# Patient Record
Sex: FEMALE | Race: WHITE | Employment: FULL TIME | URBAN - METROPOLITAN AREA
[De-identification: names, ages, dates, MRNs, and addresses within clinical notes are randomized per-mention and may not be internally consistent; named-entity substitution may affect disease eponyms.]

---

## 2018-08-02 ENCOUNTER — OFFICE VISIT (OUTPATIENT)
Dept: INTERNAL MEDICINE CLINIC | Age: 23
End: 2018-08-02

## 2018-08-02 VITALS
HEART RATE: 89 BPM | BODY MASS INDEX: 18.72 KG/M2 | SYSTOLIC BLOOD PRESSURE: 116 MMHG | TEMPERATURE: 98.1 F | OXYGEN SATURATION: 98 % | WEIGHT: 134.2 LBS | DIASTOLIC BLOOD PRESSURE: 68 MMHG

## 2018-08-02 DIAGNOSIS — Z01.818 PREOP EXAMINATION: Primary | ICD-10-CM

## 2018-08-02 DIAGNOSIS — Z01.818 PREOPERATIVE EXAMINATION: Primary | ICD-10-CM

## 2018-08-02 LAB
CONTROL: NORMAL
PREGNANCY TEST URINE, POC: NEGATIVE

## 2018-08-02 PROCEDURE — 99242 OFF/OP CONSLTJ NEW/EST SF 20: CPT | Performed by: INTERNAL MEDICINE

## 2018-08-02 PROCEDURE — 81025 URINE PREGNANCY TEST: CPT | Performed by: INTERNAL MEDICINE

## 2018-08-02 ASSESSMENT — ENCOUNTER SYMPTOMS
WHEEZING: 0
CONSTIPATION: 0
ABDOMINAL PAIN: 0
SHORTNESS OF BREATH: 0
COUGH: 0
NAUSEA: 0
VOMITING: 0
SORE THROAT: 0
DIARRHEA: 0
RHINORRHEA: 0
BLOOD IN STOOL: 0

## 2018-08-02 ASSESSMENT — PATIENT HEALTH QUESTIONNAIRE - PHQ9
1. LITTLE INTEREST OR PLEASURE IN DOING THINGS: 0
SUM OF ALL RESPONSES TO PHQ QUESTIONS 1-9: 0
2. FEELING DOWN, DEPRESSED OR HOPELESS: 0
SUM OF ALL RESPONSES TO PHQ9 QUESTIONS 1 & 2: 0

## 2018-08-02 NOTE — PATIENT INSTRUCTIONS
Stop all aspirin, ibuprofen, aleve, or any other arthritis pill (Tylenol/acetaminophen is   OK) for seven days prior to the procedure. Eat and drink nothing after midnight the night before the planned procedure.

## 2018-08-02 NOTE — PROGRESS NOTES
Subjective:      Patient ID: Jose Juan Munguia is a 26 y.o. female. HPI     Chief Complaint   Patient presents with    Pre-op Exam     8/6, screw removal right foot, Yoana De Guzman (fax# [phone])      Planned removal of hardware from right foot after foot surgery     Not ill lately  No fever/chills      Current Outpatient Prescriptions   Medication Sig Dispense Refill    Levonorgestrel (KYLEENA IU) by Intrauterine route       No current facility-administered medications for this visit. Allergies:  No Known Allergies  Prior Medical History:       Diagnosis Date    Chronic nonintractable headache 8/2/2016     Social History  Social History   Substance Use Topics    Smoking status: Never Smoker    Smokeless tobacco: Never Used    Alcohol use Yes     Prior Surgical History:      Procedure Laterality Date    FOOT SURGERY Right 12/2017    WISDOM TOOTH EXTRACTION      26  years  old     Family History:   Family History   Problem Relation Age of Onset    Heart Disease Maternal Grandmother     Heart Disease Maternal Grandfather     Diabetes Paternal Grandmother     Depression Paternal Grandfather     Other Mother         fibromyalgia    Stroke Father     Heart Disease Father         \"hole in his heart\"         Review of Systems   Constitutional: Negative for appetite change, chills, fever and unexpected weight change. HENT: Negative for dental problem, rhinorrhea and sore throat. Respiratory: Negative for cough, shortness of breath and wheezing. Cardiovascular: Negative for chest pain, palpitations and leg swelling. Gastrointestinal: Negative for abdominal pain, blood in stool, constipation, diarrhea, nausea and vomiting. Genitourinary: Negative for dysuria and hematuria. Musculoskeletal: Positive for arthralgias. Negative for myalgias. Neurological: Negative for syncope, numbness and headaches (headaches resolved with retainer).    Hematological: Negative for

## 2018-10-23 ENCOUNTER — TELEPHONE (OUTPATIENT)
Dept: INTERNAL MEDICINE CLINIC | Age: 23
End: 2018-10-23

## 2018-12-13 ENCOUNTER — CLINICAL DOCUMENTATION (OUTPATIENT)
Dept: INTERNAL MEDICINE CLINIC | Age: 23
End: 2018-12-13

## 2018-12-13 ENCOUNTER — HOSPITAL ENCOUNTER (OUTPATIENT)
Age: 23
Discharge: HOME | End: 2018-12-13
Payer: COMMERCIAL

## 2018-12-13 LAB
IGA: 133 MG/DL (ref 70–400)
T4 TOTAL: 7.7 UG/DL (ref 4.5–10.9)
TSH SERPL DL<=0.05 MIU/L-ACNC: 1.14 UIU/ML (ref 0.27–4.2)

## 2018-12-13 PROCEDURE — 84436 ASSAY OF TOTAL THYROXINE: CPT

## 2018-12-13 PROCEDURE — 84443 ASSAY THYROID STIM HORMONE: CPT

## 2018-12-13 PROCEDURE — 83516 IMMUNOASSAY NONANTIBODY: CPT

## 2018-12-13 PROCEDURE — 82784 ASSAY IGA/IGD/IGG/IGM EACH: CPT

## 2018-12-13 PROCEDURE — 36415 COLL VENOUS BLD VENIPUNCTURE: CPT

## 2018-12-15 LAB — TISSUE TRANSGLUTAMINASE IGA: 0 U/ML (ref 0–3)

## 2021-02-18 NOTE — PROGRESS NOTES
Patient ___ reached   __X___not reached-preop instructions left on voice fyvd____[phone]_________      DATE__2/25/21______ TIME__0730______ARRIVAL__0600  FEC_______      Nothing to eat or drink after midnight the night before,except for what the prep instructions call for. If you do not have the instructions or do not understand them please contact your doctors office. Follow any instructions your doctors office has given you including what medications to take the AM of your procedure and which ones to hold. You may use your inhalers. If you take a long acting insulin the laura prior please cut the dose in half and take no diabetic medications that AM.Follow specific doctors office instructions regarding blood thinners and if they want you to hold and for how long. If you are on a blood thinner and have no instructions please contact the office and ask. Dress comfortably,bring your insurance card,picture ID,and a complete list of medications, including supplements. You must have a responsible adult to stay with you during the procedure,drive you home and stay with you. [facility] phone number [phone] for any questions. OTHER INTRUCTIONS(if applicable)_________________________________________________________      COVID TEST         _____ Done ___ where ____       _X____ Scheduled 2/19/21  ___ where __[facility]__       _____Other_________________      VISITOR POLICY(subject to change)    There is a one visitor policy at [facility] for all surgeries and endoscopies. Whether the visitor can stay or will be asked to wait in the car will depend on the current policy and if social distancing can be maintained. The policy is subject to change at any time. Please make sure the visitor has a cell phone that is on,charged and able to accept calls, as this may be the way that the staff communicates with them. Pain management is NO VISITOR policyThe patients ride is expected to remain in the car with a cell phone for

## 2021-02-19 ENCOUNTER — OFFICE VISIT (OUTPATIENT)
Dept: PRIMARY CARE CLINIC | Age: 26
End: 2021-02-19
Payer: COMMERCIAL

## 2021-02-19 DIAGNOSIS — Z01.818 PREOP EXAMINATION: Primary | ICD-10-CM

## 2021-02-19 PROCEDURE — 99211 OFF/OP EST MAY X REQ PHY/QHP: CPT | Performed by: NURSE PRACTITIONER

## 2021-02-20 LAB — SARS-COV-2: NOT DETECTED

## 2021-02-24 ENCOUNTER — ANESTHESIA EVENT (OUTPATIENT)
Dept: ENDOSCOPY | Age: 26
End: 2021-02-24
Payer: COMMERCIAL

## 2021-02-25 ENCOUNTER — HOSPITAL ENCOUNTER (OUTPATIENT)
Age: 26
Setting detail: OUTPATIENT SURGERY
Discharge: HOME | End: 2021-02-25
Attending: INTERNAL MEDICINE | Admitting: INTERNAL MEDICINE
Payer: COMMERCIAL

## 2021-02-25 ENCOUNTER — ANESTHESIA (OUTPATIENT)
Dept: ENDOSCOPY | Age: 26
End: 2021-02-25
Payer: COMMERCIAL

## 2021-02-25 VITALS
SYSTOLIC BLOOD PRESSURE: 93 MMHG | DIASTOLIC BLOOD PRESSURE: 52 MMHG | RESPIRATION RATE: 21 BRPM | OXYGEN SATURATION: 100 %

## 2021-02-25 VITALS
OXYGEN SATURATION: 100 % | HEART RATE: 83 BPM | WEIGHT: 148 LBS | BODY MASS INDEX: 20.05 KG/M2 | HEIGHT: 72 IN | SYSTOLIC BLOOD PRESSURE: 106 MMHG | RESPIRATION RATE: 20 BRPM | TEMPERATURE: 97.6 F | DIASTOLIC BLOOD PRESSURE: 74 MMHG

## 2021-02-25 LAB
C-REACTIVE PROTEIN: 20 MG/L (ref 0–5.1)
FOLATE: 13.55 NG/ML (ref 4.78–24.2)
HCG(URINE) PREGNANCY TEST: NEGATIVE
HCT VFR BLD CALC: 36.4 % (ref 36–48)
HEMOGLOBIN: 12.1 G/DL (ref 12–16)
MCH RBC QN AUTO: 31.4 PG (ref 26–34)
MCHC RBC AUTO-ENTMCNC: 33.3 G/DL (ref 31–36)
MCV RBC AUTO: 94.3 FL (ref 80–100)
PDW BLD-RTO: 12.7 % (ref 12.4–15.4)
PLATELET # BLD: 166 K/UL (ref 135–450)
PMV BLD AUTO: 9.9 FL (ref 5–10.5)
RBC # BLD: 3.86 M/UL (ref 4–5.2)
VITAMIN B-12: 401 PG/ML (ref 211–911)
VITAMIN D 25-HYDROXY: 53.5 NG/ML
WBC # BLD: 7.1 K/UL (ref 4–11)

## 2021-02-25 PROCEDURE — 3609010300 HC COLONOSCOPY W/BIOPSY SINGLE/MULTIPLE: Performed by: INTERNAL MEDICINE

## 2021-02-25 PROCEDURE — 82306 VITAMIN D 25 HYDROXY: CPT

## 2021-02-25 PROCEDURE — 3700000000 HC ANESTHESIA ATTENDED CARE: Performed by: INTERNAL MEDICINE

## 2021-02-25 PROCEDURE — 86140 C-REACTIVE PROTEIN: CPT

## 2021-02-25 PROCEDURE — 6360000002 HC RX W HCPCS: Performed by: NURSE ANESTHETIST, CERTIFIED REGISTERED

## 2021-02-25 PROCEDURE — 2500000003 HC RX 250 WO HCPCS: Performed by: NURSE ANESTHETIST, CERTIFIED REGISTERED

## 2021-02-25 PROCEDURE — 2580000003 HC RX 258: Performed by: NURSE ANESTHETIST, CERTIFIED REGISTERED

## 2021-02-25 PROCEDURE — 7100000010 HC PHASE II RECOVERY - FIRST 15 MIN: Performed by: INTERNAL MEDICINE

## 2021-02-25 PROCEDURE — 3700000001 HC ADD 15 MINUTES (ANESTHESIA): Performed by: INTERNAL MEDICINE

## 2021-02-25 PROCEDURE — 7100000011 HC PHASE II RECOVERY - ADDTL 15 MIN: Performed by: INTERNAL MEDICINE

## 2021-02-25 PROCEDURE — 2580000003 HC RX 258: Performed by: ANESTHESIOLOGY

## 2021-02-25 PROCEDURE — 2709999900 HC NON-CHARGEABLE SUPPLY: Performed by: INTERNAL MEDICINE

## 2021-02-25 PROCEDURE — 3609012400 HC EGD TRANSORAL BIOPSY SINGLE/MULTIPLE: Performed by: INTERNAL MEDICINE

## 2021-02-25 PROCEDURE — 82746 ASSAY OF FOLIC ACID SERUM: CPT

## 2021-02-25 PROCEDURE — 82607 VITAMIN B-12: CPT

## 2021-02-25 PROCEDURE — 36415 COLL VENOUS BLD VENIPUNCTURE: CPT

## 2021-02-25 PROCEDURE — 85027 COMPLETE CBC AUTOMATED: CPT

## 2021-02-25 PROCEDURE — 84703 CHORIONIC GONADOTROPIN ASSAY: CPT

## 2021-02-25 RX ORDER — SODIUM CHLORIDE 9 MG/ML
INJECTION, SOLUTION INTRAVENOUS CONTINUOUS PRN
Status: DISCONTINUED | OUTPATIENT
Start: 2021-02-25 | End: 2021-02-25 | Stop reason: SDUPTHER

## 2021-02-25 RX ORDER — LIDOCAINE HYDROCHLORIDE 20 MG/ML
INJECTION, SOLUTION EPIDURAL; INFILTRATION; INTRACAUDAL; PERINEURAL PRN
Status: DISCONTINUED | OUTPATIENT
Start: 2021-02-25 | End: 2021-02-25 | Stop reason: SDUPTHER

## 2021-02-25 RX ORDER — PROPOFOL 10 MG/ML
INJECTION, EMULSION INTRAVENOUS PRN
Status: DISCONTINUED | OUTPATIENT
Start: 2021-02-25 | End: 2021-02-25 | Stop reason: SDUPTHER

## 2021-02-25 RX ORDER — SODIUM CHLORIDE 9 MG/ML
INJECTION, SOLUTION INTRAVENOUS CONTINUOUS
Status: DISCONTINUED | OUTPATIENT
Start: 2021-02-25 | End: 2021-02-25 | Stop reason: HOSPADM

## 2021-02-25 RX ORDER — GLYCOPYRROLATE 1 MG/5 ML
SYRINGE (ML) INTRAVENOUS PRN
Status: DISCONTINUED | OUTPATIENT
Start: 2021-02-25 | End: 2021-02-25 | Stop reason: SDUPTHER

## 2021-02-25 RX ADMIN — PROPOFOL 100 MG: 10 INJECTION, EMULSION INTRAVENOUS at 07:32

## 2021-02-25 RX ADMIN — PROPOFOL 50 MG: 10 INJECTION, EMULSION INTRAVENOUS at 07:40

## 2021-02-25 RX ADMIN — PROPOFOL 30 MG: 10 INJECTION, EMULSION INTRAVENOUS at 07:46

## 2021-02-25 RX ADMIN — PROPOFOL 20 MG: 10 INJECTION, EMULSION INTRAVENOUS at 07:52

## 2021-02-25 RX ADMIN — PROPOFOL 20 MG: 10 INJECTION, EMULSION INTRAVENOUS at 07:43

## 2021-02-25 RX ADMIN — PROPOFOL 30 MG: 10 INJECTION, EMULSION INTRAVENOUS at 07:49

## 2021-02-25 RX ADMIN — PHENYLEPHRINE HYDROCHLORIDE 50 MCG: 10 INJECTION INTRAVENOUS at 07:33

## 2021-02-25 RX ADMIN — SODIUM CHLORIDE: 9 INJECTION, SOLUTION INTRAVENOUS at 07:30

## 2021-02-25 RX ADMIN — SODIUM CHLORIDE: 9 INJECTION, SOLUTION INTRAVENOUS at 07:00

## 2021-02-25 RX ADMIN — PROPOFOL 50 MG: 10 INJECTION, EMULSION INTRAVENOUS at 07:34

## 2021-02-25 RX ADMIN — PROPOFOL 50 MG: 10 INJECTION, EMULSION INTRAVENOUS at 07:37

## 2021-02-25 RX ADMIN — LIDOCAINE HYDROCHLORIDE 50 MG: 20 INJECTION, SOLUTION EPIDURAL; INFILTRATION; INTRACAUDAL; PERINEURAL at 07:32

## 2021-02-25 RX ADMIN — Medication 0.1 MG: at 07:33

## 2021-02-25 NOTE — PROGRESS NOTES
Dressed and discharged to home with parents. Has instructions, prescription, and all belongings. Fluids given to go.

## 2021-02-25 NOTE — ANESTHESIA PRE PROCEDURE
Department of Anesthesiology  Preprocedure Note       Name:  Mayela Lynne   Age:  26 y. o.  :  1995                                          MRN:  [id number]         Date:  2021      Surgeon: Luz Acuna):  Kp Palacios MD    Procedure: Procedure(s):  COLONOSCOPY DIAGNOSTIC  EGD DIAGNOSTIC ONLY    Medications prior to admission:   Prior to Admission medications    Medication Sig Start Date End Date Taking? Authorizing Provider   Norgestimate-Eth Estradiol (SPRINTEC 28 PO) Take 1 tablet by mouth daily   Yes Historical Provider, MD       Current medications:    No current facility-administered medications for this encounter. Allergies: Allergies   Allergen Reactions    Shiitake Mushroom Itching     All mushrooms. ithcy throat. Mild swelling of throat       Problem List:    Patient Active Problem List   Diagnosis Code    Fatigue R53.83    LUQ abdominal pain R10.12    Encounter for contraceptive management Z30.9       Past Medical History:        Diagnosis Date    Abdominal pain     Chronic nonintractable headache 2016    Constipation     Diarrhea        Past Surgical History:        Procedure Laterality Date    FOOT SURGERY Right 2017    WISDOM TOOTH EXTRACTION      26  years  old       Social History:    Social History     Tobacco Use    Smoking status: Never Smoker    Smokeless tobacco: Never Used   Substance Use Topics    Alcohol use:  Yes                                Counseling given: Not Answered      Vital Signs (Current):   Vitals:    21 0702   BP: 110/76   Pulse: 79   Resp: 16   Temp: 97.2 °F (36.2 °C)   TempSrc: Temporal   SpO2: 98%   Weight: 148 lb (67.1 kg)   Height: 6' (1.829 m)                                              BP Readings from Last 3 Encounters:   21 110/76   18 116/68   16 100/78       NPO Status: Time of last liquid consumption: 0230(prep)                        Time of last solid consumption: 1800 Date of last liquid consumption: 02/25/21                        Date of last solid food consumption: 02/23/21    BMI:   Wt Readings from Last 3 Encounters:   02/25/21 148 lb (67.1 kg)   08/02/18 134 lb 3.2 oz (60.9 kg)   08/23/16 137 lb 3.2 oz (62.2 kg)     Body mass index is 20.07 kg/m². CBC:   Lab Results   Component Value Date    WBC 9.6 08/02/2016    RBC 5.00 08/02/2016    HGB 15.1 08/02/2016    HCT 46.2 08/02/2016    MCV 92.6 08/02/2016    RDW 13.6 08/02/2016     08/02/2016       CMP:   Lab Results   Component Value Date     08/02/2016    K 4.7 08/02/2016     08/02/2016    CO2 25 08/02/2016    BUN 12 08/02/2016    CREATININE 0.9 08/02/2016    GFRAA >60 08/02/2016    AGRATIO 1.6 08/02/2016    LABGLOM >60 08/02/2016    GLUCOSE 106 08/02/2016    PROT 7.9 08/02/2016    CALCIUM 9.9 08/02/2016    BILITOT 0.6 08/02/2016    ALKPHOS 73 08/02/2016    AST 14 08/02/2016    ALT 11 08/02/2016       POC Tests: No results for input(s): POCGLU, POCNA, POCK, POCCL, POCBUN, POCHEMO, POCHCT in the last 72 hours.     Coags: No results found for: PROTIME, INR, APTT    HCG (If Applicable):   Lab Results   Component Value Date    PREGTESTUR Negative 02/25/2021        ABGs: No results found for: PHART, PO2ART, ZCZ3RDB, HHK2LOO, BEART, H9MCIURO     Type & Screen (If Applicable):  No results found for: LABABO, LABRH    Drug/Infectious Status (If Applicable):  No results found for: HIV, HEPCAB    COVID-19 Screening (If Applicable):   Lab Results   Component Value Date    COVID19 Not Detected 02/19/2021         Anesthesia Evaluation  Patient summary reviewed and Nursing notes reviewed  Airway: Mallampati: II        Dental:          Pulmonary:                              Cardiovascular:  Exercise tolerance: good (>4 METS),                     Neuro/Psych:               GI/Hepatic/Renal:             Endo/Other:                     Abdominal:           Vascular: Anesthesia Plan      MAC     ASA 2                                 Stacey MD Huseyin   2/25/2021

## 2021-02-25 NOTE — PROGRESS NOTES
Received from Endo Procedure Room to Phase 2 Recovery. Alert. Respirations easy on room air. VSS. Denies any discomfort. Lab called to draw multiple labs.

## 2021-02-25 NOTE — H&P
Gastroenterology Note             Pre-operative History and Physical    Patient: Faina Rojas  : 1995  CSN:     History Obtained From:  patient and/or guardian. HISTORY OF PRESENT ILLNESS:    The patient is a 26 y.o. female  here for colonoscopy. Past Medical History:    Past Medical History:   Diagnosis Date    Abdominal pain     Chronic nonintractable headache 2016    Constipation     Diarrhea      Past Surgical History:    Past Surgical History:   Procedure Laterality Date    FOOT SURGERY Right 2017    WISDOM TOOTH EXTRACTION      26  years  old     Medications Prior to Admission:   No current facility-administered medications on file prior to encounter.       Current Outpatient Medications on File Prior to Encounter   Medication Sig Dispense Refill    Norgestimate-Eth Estradiol (SPRINTEC 28 PO) Take 1 tablet by mouth daily          Allergies:  Shiitake mushroom      Social History:   Social History     Tobacco Use    Smoking status: Never Smoker    Smokeless tobacco: Never Used   Substance Use Topics    Alcohol use: Yes     Family History:   Family History   Problem Relation Age of Onset    Heart Disease Maternal Grandmother     Heart Disease Maternal Grandfather     Diabetes Paternal Grandmother     Depression Paternal Grandfather     Other Mother         fibromyalgia    Stroke Father     Heart Disease Father         \"hole in his heart\"    Other Maternal Uncle         ulcerative colitis    Other Paternal Aunt         ilcerative colitis       PHYSICAL EXAM:      /76 Comment: RUBY  Pulse 79   Temp 97.2 °F (36.2 °C) (Temporal)   Resp 16   Ht 6' (1.829 m)   Wt 148 lb (67.1 kg)   LMP 2021   SpO2 98%   BMI 20.07 kg/m²  I        Heart:   RRR, normal s1s2    Lungs:  CTA bilat,  Normal effort    Abdomen:   NT, ND      ASA Grade:  ASA 2 - Patient with mild systemic disease with no functional limitations    Mallampati Class:  Class I: Soft palate, uvula, fauces, pillars visible  __________  Class II: Soft palate, uvula, fauces visible  __________   Class III: Soft palate, base of uvula visible  __________  Class IV: Hard palate only visible   __________        ASSESSMENT AND PLAN:    1. Patient is a 26 y.o. female here for colonoscopy with MAC.   2.  Procedure options, risks and benefits reviewed with patient. Patient expresses understanding.

## 2021-07-21 ENCOUNTER — HOSPITAL ENCOUNTER (OUTPATIENT)
Dept: CT IMAGING | Age: 26
Discharge: HOME | End: 2021-07-21
Payer: COMMERCIAL

## 2021-07-21 DIAGNOSIS — R10.9 ABDOMINAL PAIN, UNSPECIFIED ABDOMINAL LOCATION: ICD-10-CM

## 2021-07-21 PROCEDURE — 74177 CT ABD & PELVIS W/CONTRAST: CPT

## 2021-07-21 PROCEDURE — 6360000004 HC RX CONTRAST MEDICATION: Performed by: INTERNAL MEDICINE

## 2021-07-21 PROCEDURE — 2500000003 HC RX 250 WO HCPCS: Performed by: INTERNAL MEDICINE

## 2021-07-21 RX ADMIN — BARIUM SULFATE 450 ML: 1 SUSPENSION ORAL at 09:34

## 2021-07-21 RX ADMIN — IOPAMIDOL 80 ML: 755 INJECTION, SOLUTION INTRAVENOUS at 09:34

## 2021-07-27 LAB
BASOPHILS ABSOLUTE: 0 K/UL (ref 0–0.2)
BASOPHILS RELATIVE PERCENT: 0.4 %
EOSINOPHILS ABSOLUTE: 0.1 K/UL (ref 0–0.6)
EOSINOPHILS RELATIVE PERCENT: 0.9 %
FERRITIN: 41.4 NG/ML (ref 15–150)
HCT VFR BLD CALC: 44.9 % (ref 36–48)
HEMOGLOBIN: 15.2 G/DL (ref 12–16)
IRON SATURATION: 53 % (ref 15–50)
IRON: 156 UG/DL (ref 37–145)
LYMPHOCYTES ABSOLUTE: 2 K/UL (ref 1–5.1)
LYMPHOCYTES RELATIVE PERCENT: 28.2 %
MCH RBC QN AUTO: 32.8 PG (ref 26–34)
MCHC RBC AUTO-ENTMCNC: 33.8 G/DL (ref 31–36)
MCV RBC AUTO: 96.9 FL (ref 80–100)
MONOCYTES ABSOLUTE: 0.5 K/UL (ref 0–1.3)
MONOCYTES RELATIVE PERCENT: 6.6 %
NEUTROPHILS ABSOLUTE: 4.4 K/UL (ref 1.7–7.7)
NEUTROPHILS RELATIVE PERCENT: 63.9 %
PDW BLD-RTO: 13.6 % (ref 12.4–15.4)
PLATELET # BLD: 229 K/UL (ref 135–450)
PMV BLD AUTO: 9.1 FL (ref 5–10.5)
RBC # BLD: 4.64 M/UL (ref 4–5.2)
TOTAL IRON BINDING CAPACITY: 294 UG/DL (ref 260–445)
WBC # BLD: 6.9 K/UL (ref 4–11)

## 2021-07-28 LAB — C-REACTIVE PROTEIN: <3 MG/L (ref 0–5.1)

## 2022-02-17 ENCOUNTER — HOSPITAL ENCOUNTER (OUTPATIENT)
Age: 27
Discharge: HOME | End: 2022-02-17
Payer: COMMERCIAL

## 2022-02-17 PROCEDURE — 87338 HPYLORI STOOL AG IA: CPT

## 2022-02-20 LAB — H PYLORI ANTIGEN STOOL: NEGATIVE

## 2022-03-14 ENCOUNTER — HOSPITAL ENCOUNTER (EMERGENCY)
Age: 27
Discharge: HOME | End: 2022-03-15
Attending: EMERGENCY MEDICINE
Payer: COMMERCIAL

## 2022-03-14 DIAGNOSIS — R10.9 LEFT SIDED ABDOMINAL PAIN: ICD-10-CM

## 2022-03-14 DIAGNOSIS — R11.2 NAUSEA VOMITING AND DIARRHEA: Primary | ICD-10-CM

## 2022-03-14 DIAGNOSIS — R19.7 NAUSEA VOMITING AND DIARRHEA: Primary | ICD-10-CM

## 2022-03-14 LAB
ALBUMIN SERPL-MCNC: 4.3 G/DL (ref 3.4–5)
ALP BLD-CCNC: 60 U/L (ref 40–129)
ALT SERPL-CCNC: 12 U/L (ref 10–40)
ANION GAP SERPL CALCULATED.3IONS-SCNC: 12 MMOL/L (ref 3–16)
AST SERPL-CCNC: 16 U/L (ref 15–37)
BASE EXCESS VENOUS: 0.2 MMOL/L (ref -2–3)
BASOPHILS ABSOLUTE: 0 K/UL (ref 0–0.2)
BASOPHILS RELATIVE PERCENT: 0.5 %
BILIRUB SERPL-MCNC: 0.8 MG/DL (ref 0–1)
BILIRUBIN DIRECT: <0.2 MG/DL (ref 0–0.3)
BILIRUBIN, INDIRECT: NORMAL MG/DL (ref 0–1)
BUN BLDV-MCNC: 13 MG/DL (ref 7–20)
CALCIUM SERPL-MCNC: 8.8 MG/DL (ref 8.3–10.6)
CARBOXYHEMOGLOBIN: 1.1 % (ref 0–1.5)
CHLORIDE BLD-SCNC: 99 MMOL/L (ref 99–110)
CO2: 23 MMOL/L (ref 21–32)
CREAT SERPL-MCNC: 1 MG/DL (ref 0.6–1.1)
EOSINOPHILS ABSOLUTE: 0 K/UL (ref 0–0.6)
EOSINOPHILS RELATIVE PERCENT: 0.1 %
GFR AFRICAN AMERICAN: >60
GFR NON-AFRICAN AMERICAN: >60
GLUCOSE BLD-MCNC: 105 MG/DL (ref 70–99)
HCG QUALITATIVE: NEGATIVE
HCO3 VENOUS: 26.1 MMOL/L (ref 24–28)
HCT VFR BLD CALC: 42.3 % (ref 36–48)
HEMOGLOBIN, VEN, REDUCED: 25.4 %
HEMOGLOBIN: 14.3 G/DL (ref 12–16)
LACTIC ACID: 1 MMOL/L (ref 0.4–2)
LIPASE: 25 U/L (ref 13–60)
LYMPHOCYTES ABSOLUTE: 0.3 K/UL (ref 1–5.1)
LYMPHOCYTES RELATIVE PERCENT: 3.6 %
MCH RBC QN AUTO: 32 PG (ref 26–34)
MCHC RBC AUTO-ENTMCNC: 33.9 G/DL (ref 31–36)
MCV RBC AUTO: 94.5 FL (ref 80–100)
METHEMOGLOBIN VENOUS: 0.1 % (ref 0–1.5)
MONOCYTES ABSOLUTE: 0.6 K/UL (ref 0–1.3)
MONOCYTES RELATIVE PERCENT: 6.5 %
NEUTROPHILS ABSOLUTE: 7.8 K/UL (ref 1.7–7.7)
NEUTROPHILS RELATIVE PERCENT: 89.3 %
O2 SAT, VEN: 74 %
PCO2, VEN: 45.9 MMHG (ref 41–51)
PDW BLD-RTO: 12.9 % (ref 12.4–15.4)
PH VENOUS: 7.36 (ref 7.35–7.45)
PLATELET # BLD: 179 K/UL (ref 135–450)
PMV BLD AUTO: 9.1 FL (ref 5–10.5)
PO2, VEN: 40.4 MMHG (ref 25–40)
POTASSIUM REFLEX MAGNESIUM: 3.7 MMOL/L (ref 3.5–5.1)
RBC # BLD: 4.48 M/UL (ref 4–5.2)
SODIUM BLD-SCNC: 134 MMOL/L (ref 136–145)
TCO2 CALC VENOUS: 28 MMOL/L
TOTAL PROTEIN: 7.4 G/DL (ref 6.4–8.2)
WBC # BLD: 8.7 K/UL (ref 4–11)

## 2022-03-14 PROCEDURE — 2580000003 HC RX 258: Performed by: EMERGENCY MEDICINE

## 2022-03-14 PROCEDURE — 80048 BASIC METABOLIC PNL TOTAL CA: CPT

## 2022-03-14 PROCEDURE — 80076 HEPATIC FUNCTION PANEL: CPT

## 2022-03-14 PROCEDURE — 83690 ASSAY OF LIPASE: CPT

## 2022-03-14 PROCEDURE — 99285 EMERGENCY DEPT VISIT HI MDM: CPT

## 2022-03-14 PROCEDURE — 85025 COMPLETE CBC W/AUTO DIFF WBC: CPT

## 2022-03-14 PROCEDURE — 6360000002 HC RX W HCPCS: Performed by: EMERGENCY MEDICINE

## 2022-03-14 PROCEDURE — 83605 ASSAY OF LACTIC ACID: CPT

## 2022-03-14 PROCEDURE — 82803 BLOOD GASES ANY COMBINATION: CPT

## 2022-03-14 PROCEDURE — 36415 COLL VENOUS BLD VENIPUNCTURE: CPT

## 2022-03-14 PROCEDURE — 84703 CHORIONIC GONADOTROPIN ASSAY: CPT

## 2022-03-14 RX ORDER — ONDANSETRON 2 MG/ML
4 INJECTION INTRAMUSCULAR; INTRAVENOUS EVERY 6 HOURS PRN
Status: DISCONTINUED | OUTPATIENT
Start: 2022-03-14 | End: 2022-03-15 | Stop reason: HOSPADM

## 2022-03-14 RX ORDER — 0.9 % SODIUM CHLORIDE 0.9 %
1000 INTRAVENOUS SOLUTION INTRAVENOUS ONCE
Status: COMPLETED | OUTPATIENT
Start: 2022-03-14 | End: 2022-03-15

## 2022-03-14 RX ORDER — LINACLOTIDE 290 UG/1
CAPSULE, GELATIN COATED ORAL EVERY MORNING
COMMUNITY
Start: 2021-07-29

## 2022-03-14 RX ORDER — HYOSCYAMINE SULFATE 0.125 MG
TABLET,DISINTEGRATING ORAL
COMMUNITY
Start: 2022-02-17 | End: 2022-07-18 | Stop reason: ALTCHOICE

## 2022-03-14 RX ORDER — KETOROLAC TROMETHAMINE 30 MG/ML
15 INJECTION, SOLUTION INTRAMUSCULAR; INTRAVENOUS ONCE
Status: COMPLETED | OUTPATIENT
Start: 2022-03-14 | End: 2022-03-14

## 2022-03-14 RX ADMIN — SODIUM CHLORIDE 1000 ML: 0.9 INJECTION, SOLUTION INTRAVENOUS at 23:12

## 2022-03-14 RX ADMIN — KETOROLAC TROMETHAMINE 15 MG: 30 INJECTION, SOLUTION INTRAMUSCULAR at 23:13

## 2022-03-14 RX ADMIN — ONDANSETRON 4 MG: 2 INJECTION INTRAMUSCULAR; INTRAVENOUS at 23:13

## 2022-03-14 ASSESSMENT — PAIN DESCRIPTION - PAIN TYPE: TYPE: ACUTE PAIN;CHRONIC PAIN

## 2022-03-14 ASSESSMENT — PAIN DESCRIPTION - FREQUENCY: FREQUENCY: CONTINUOUS

## 2022-03-14 ASSESSMENT — PAIN SCALES - GENERAL
PAINLEVEL_OUTOF10: 8
PAINLEVEL_OUTOF10: 8

## 2022-03-14 ASSESSMENT — PAIN - FUNCTIONAL ASSESSMENT: PAIN_FUNCTIONAL_ASSESSMENT: 0-10

## 2022-03-14 ASSESSMENT — PAIN DESCRIPTION - LOCATION: LOCATION: ABDOMEN

## 2022-03-14 ASSESSMENT — PAIN DESCRIPTION - DESCRIPTORS: DESCRIPTORS: CONSTANT

## 2022-03-15 VITALS
HEIGHT: 71 IN | WEIGHT: 155 LBS | DIASTOLIC BLOOD PRESSURE: 63 MMHG | TEMPERATURE: 98.5 F | SYSTOLIC BLOOD PRESSURE: 97 MMHG | OXYGEN SATURATION: 97 % | RESPIRATION RATE: 15 BRPM | BODY MASS INDEX: 21.7 KG/M2 | HEART RATE: 88 BPM

## 2022-03-15 PROCEDURE — 96374 THER/PROPH/DIAG INJ IV PUSH: CPT

## 2022-03-15 PROCEDURE — 2500000003 HC RX 250 WO HCPCS: Performed by: EMERGENCY MEDICINE

## 2022-03-15 PROCEDURE — 6370000000 HC RX 637 (ALT 250 FOR IP): Performed by: EMERGENCY MEDICINE

## 2022-03-15 PROCEDURE — 96375 TX/PRO/DX INJ NEW DRUG ADDON: CPT

## 2022-03-15 RX ORDER — DICYCLOMINE HYDROCHLORIDE 10 MG/1
10 CAPSULE ORAL ONCE
Status: COMPLETED | OUTPATIENT
Start: 2022-03-15 | End: 2022-03-15

## 2022-03-15 RX ORDER — ONDANSETRON 4 MG/1
4 TABLET, ORALLY DISINTEGRATING ORAL EVERY 8 HOURS PRN
Qty: 20 TABLET | Refills: 0 | Status: SHIPPED | OUTPATIENT
Start: 2022-03-15

## 2022-03-15 RX ORDER — DICYCLOMINE HYDROCHLORIDE 10 MG/1
10 CAPSULE ORAL EVERY 6 HOURS PRN
Qty: 20 CAPSULE | Refills: 0 | Status: SHIPPED | OUTPATIENT
Start: 2022-03-15

## 2022-03-15 RX ADMIN — DICYCLOMINE HYDROCHLORIDE 10 MG: 10 CAPSULE ORAL at 01:58

## 2022-03-15 RX ADMIN — FAMOTIDINE 20 MG: 10 INJECTION, SOLUTION INTRAVENOUS at 01:58

## 2022-03-15 ASSESSMENT — PAIN DESCRIPTION - FREQUENCY: FREQUENCY: CONTINUOUS

## 2022-03-15 ASSESSMENT — PAIN DESCRIPTION - LOCATION: LOCATION: ABDOMEN

## 2022-03-15 ASSESSMENT — PAIN SCALES - GENERAL: PAINLEVEL_OUTOF10: 3

## 2022-03-15 ASSESSMENT — ENCOUNTER SYMPTOMS
BLOOD IN STOOL: 0
COUGH: 0
SHORTNESS OF BREATH: 0
EYES NEGATIVE: 1
ABDOMINAL PAIN: 1
NAUSEA: 1
SORE THROAT: 0
VOMITING: 1
RESPIRATORY NEGATIVE: 1
RHINORRHEA: 0
DIARRHEA: 1

## 2022-03-15 ASSESSMENT — PAIN DESCRIPTION - PAIN TYPE: TYPE: CHRONIC PAIN;ACUTE PAIN

## 2022-03-15 ASSESSMENT — PAIN DESCRIPTION - PROGRESSION: CLINICAL_PROGRESSION: GRADUALLY IMPROVING

## 2022-03-15 NOTE — ED PROVIDER NOTES
[address] [place]          ATTENDING PHYSICIAN NOTE       Date of evaluation: 3/14/2022    Chief Complaint     Abdominal Pain, Emesis, and Fever      History of Present Illness     Marcos Marcus is a 33 y.o. female who presents with complaints of abdominal pain, nausea and vomiting, diarrhea, and fever at home. Patient states that she has a long history of \"stomach issues,\" and that she was diagnosed by colonoscopy about a year ago with an area of inflammation in the right colon that was concerning for potential Crohn's disease. She states that several times per week she experiences pain in the right lateral aspect of the abdomen, and sometimes in the left lateral aspect of the abdomen, typically after eating. Beginning on Thursday of last week, March 10, she began to experience what she felt was her typical bilateral lateral abdominal pain after eating. However, this time the pain did not resolve after a couple of hours, as it typically does. She has been experiencing pain that is most prominent in the left lateral aspect of the abdomen, slightly present in the right mid abdomen, waxing and waning but essentially constant since that time. Through the weekend she continued to experience his pain, worse after eating, transiently better sometimes, but was able to tolerate all of her normal p.o. intake without difficulty. She had some mild nausea, she states, but no vomiting. Starting this morning, the patient states that she had watery stool, which was the first bowel movement that she had passed since Saturday the 12th, this was then followed by increasing nausea and then by recurrent episodes of vomiting. The patient states that she has been vomiting all day, and has not been able to tolerate any p.o. food or fluids all day. She feels that her abdominal pain has been worsened with the vomiting.   She has felt generalized body aches, as well as subjective chills and fevers, and documented a fever of just under 101 °F at home, which had resolved by the time of her presentation to the emergency department. She denies any URI symptoms or cough, shortness of breath, chest pain. She does not know of any sick contacts. Review of Systems     Review of Systems   Constitutional: Positive for activity change, appetite change, chills, fatigue and fever. HENT: Negative. Negative for congestion, rhinorrhea and sore throat. Eyes: Negative. Negative for visual disturbance. Respiratory: Negative. Negative for cough and shortness of breath. Cardiovascular: Negative. Negative for chest pain and palpitations. Gastrointestinal: Positive for abdominal pain, diarrhea, nausea and vomiting. Negative for blood in stool. Genitourinary: Negative. Negative for difficulty urinating and dysuria. Musculoskeletal: Positive for myalgias. Skin: Negative. Neurological: Negative for dizziness, seizures and syncope. Psychiatric/Behavioral: Negative. Past Medical, Surgical, Family, and Social History     She has a past medical history of Abdominal pain, Chronic nonintractable headache, Constipation, and Diarrhea. She has a past surgical history that includes [place] tooth extraction; Foot surgery (Right, 12/2017); Colonoscopy (N/A, 2/25/2021); and Upper gastrointestinal endoscopy (N/A, 2/25/2021). Her family history includes Depression in her paternal grandfather; Diabetes in her paternal grandmother; Heart Disease in her father, maternal grandfather, and maternal grandmother; Other in her maternal uncle, mother, and paternal aunt; Stroke in her father. She reports that she has never smoked. She has never used smokeless tobacco. She reports current alcohol use. She reports that she does not use drugs.     Medications     Previous Medications    HYOSCYAMINE (OSCIMIN) 125 MCG TBDP DISPERSIBLE TABLET    DISSOLVE 1 TABLET ON THE TONGUE 30 TO 60 MINUTES BEFORE MEALS LINACLOTIDE (LINZESS) 290 MCG CAPS CAPSULE    Take by mouth every morning    NORGESTIMATE-ETH ESTRADIOL (SPRINTEC 28 PO)    Take 1 tablet by mouth daily       Allergies     She is allergic to shiitake mushroom. Physical Exam     INITIAL VITALS: BP: 108/71, Temp: 98.5 °F (36.9 °C), Pulse: 98, Resp: 20, SpO2: 95 %     General: Well appearing, slender young woman. Calm and pleasantly conversational.  In NAD. HEENT: Pupils are equal, round, and reactive to light. Extraocular muscles are intact. Conjunctivae are clear and moist. No redness or drainage from the eyes. Neck: Supple, with full range of motion. Cardiovascular: Normal S1-S2 without murmur rub or gallop. 2+ radial pulses bilaterally. Respiratory: Unlabored breathing with equal chest rise and fall. Lungs are clear to auscultation bilaterally. No adventitious lung sounds heard. Abdomen: Soft and nondistended. There is mild tenderness to palpation in the left lateral and mid lower abdomen, without guarding or rebound tenderness. No right-sided abdominal tenderness to palpation. No masses or hepatosplenomegaly. Normal bowel sounds throughout. Skin: Warm and dry, without rashes or ecchymoses, lacerations or abrasions. Neuro: Alert and oriented x3. No focal neurologic deficits are noted. Extremities: Warm and well-perfused, without clubbing, cyanosis, or edema. The patient moves all extremities equally. Psych: The patient's mood and affect are generally within normal limits for their presentation.       Diagnostic Results       RADIOLOGY:  No orders to display       LABS:   Results for orders placed or performed during the hospital encounter of 03/14/22   CBC with Auto Differential   Result Value Ref Range    WBC 8.7 4.0 - 11.0 K/uL    RBC 4.48 4.00 - 5.20 M/uL    Hemoglobin 14.3 12.0 - 16.0 g/dL    Hematocrit 42.3 36.0 - 48.0 %    MCV 94.5 80.0 - 100.0 fL    MCH 32.0 26.0 - 34.0 pg    MCHC 33.9 31.0 - 36.0 g/dL    RDW 12.9 12.4 Surgical Hx, Social Hx, Allergies, and Family Hx were reviewed. The patient was given the following medications:  Orders Placed This Encounter   Medications    0.9 % sodium chloride bolus    ondansetron (ZOFRAN) injection 4 mg    ketorolac (TORADOL) injection 15 mg    famotidine (PEPCID) injection 20 mg    dicyclomine (BENTYL) capsule 10 mg    ondansetron (ZOFRAN ODT) 4 MG disintegrating tablet     Sig: Place 1 tablet under the tongue every 8 hours as needed for Nausea     Dispense:  20 tablet     Refill:  0    dicyclomine (BENTYL) 10 MG capsule     Sig: Take 1 capsule by mouth every 6 hours as needed (cramping abdominal pain)     Dispense:  20 capsule     Refill:  0       CONSULTS:  None    MEDICAL DECISION MAKING / ASSESSMENT / Naomy Klaudia is a 33 y.o. female, generally healthy, although followed by gastroenterology for possible inflammatory bowel disease, who presents to the emergency department with more prominent and persistent postprandial abdominal pain for about 5 days, with nausea, vomiting, diarrhea, body aches, fevers and chills for the last 12 to 24 hours. She is overall well-hydrated appearing, with normal vital signs. Her abdomen is soft, with mild tenderness in the left mid abdomen and mid lower abdomen. She has no right upper or right lower quadrant tenderness to palpation. Patient was treated initially with normal saline, Toradol, and Zofran, and had essentially complete resolution of her symptoms. Laboratory evaluation is generally unremarkable. On reevaluation, the patient had no significant tenderness to palpation, with what she described as a mild residual soreness in the left mid abdomen.   She was beginning to experience some recurrence of discomfort though, and also complained of heartburn symptoms, which has been a recurrent difficulty for her in the past.  She was given a dose of Pepcid, and was also given a dose of oral Bentyl for the recurrent abdominal pain.  About 45 minutes later, on reevaluation the patient again endorsed essentially complete resolution of her symptoms. Her abdomen remains very soft and benign, with no significant tenderness to palpation after treatment. At this time, there is no indication for cross-sectional imaging, and given her very benign abdominal examination, normal laboratory evaluation, and resolution of symptoms with therapeutics in the emergency department, the risks of radiation exposure are felt to be greater than the potential benefit of cross-sectional imaging. The patient is in agreement. Presentation is most consistent with a viral gastroenteritis superimposed on what she has been told is mild inflammatory bowel disease. Patient will be discharged with prescriptions for Zofran and dicyclomine, as she felt that the dicyclomine and proved her symptoms significantly. She is noted to have a prescription for hyoscyamine sublingual tablets at home, but she states that she is taking this only once or twice, and does not take it on a regular basis. She would like to try a prescription for dicyclomine at home, as it helped her so well this evening. She was advised not to take them together. She is asked to follow-up with her gastroenterologist as an outpatient, and was given return precautions for worsening symptoms or other concerns. Clinical Impression     1. Nausea vomiting and diarrhea    2.  Left sided abdominal pain        Disposition     PATIENT REFERRED TO:  Tyrese Pérez, [address] [facility]  [phone]    Call in 1 day  to discuss your ER visit, and arrange a follow-up appointment      DISCHARGE MEDICATIONS:  New Prescriptions    DICYCLOMINE (BENTYL) 10 MG CAPSULE    Take 1 capsule by mouth every 6 hours as needed (cramping abdominal pain)    ONDANSETRON (ZOFRAN ODT) 4 MG DISINTEGRATING TABLET    Place 1 tablet under the tongue every 8 hours as needed for Nausea       DISPOSITION decision to discharge    (Please note that portions of this note were completed with voice recognition software.   Efforts were made to edit the dictations but occasionally words are mis-transcribed.)     Elif Britton MD  03/15/22 0940

## 2022-03-15 NOTE — ED NOTES
Patient discharged to home with family. Patient verbalized understanding of discharge instructions. Advised of when to return to ED and when to call 911. Advised of follow up with PCP. Patient received 2 Rx's with education. Patient verbalized understanding of education. No questions from patient to this RN. Patient left ED without incident.       Staci Dickerson RN  03/15/22 9942

## 2022-07-14 ENCOUNTER — HOSPITAL ENCOUNTER (EMERGENCY)
Age: 27
Discharge: HOME | End: 2022-07-15
Attending: EMERGENCY MEDICINE
Payer: COMMERCIAL

## 2022-07-14 DIAGNOSIS — R10.9 RECURRENT ABDOMINAL PAIN: Primary | ICD-10-CM

## 2022-07-14 LAB
BILIRUBIN URINE: NEGATIVE
BLOOD, URINE: NEGATIVE
CLARITY: CLEAR
COLOR: YELLOW
GLUCOSE URINE: 250 MG/DL
HCG(URINE) PREGNANCY TEST: NEGATIVE
KETONES, URINE: NEGATIVE MG/DL
LEUKOCYTE ESTERASE, URINE: ABNORMAL
MICROSCOPIC EXAMINATION: YES
NITRITE, URINE: NEGATIVE
PH UA: 6 (ref 5–8)
PROTEIN UA: NEGATIVE MG/DL
SPECIFIC GRAVITY UA: 1.02 (ref 1–1.03)
URINE TYPE: ABNORMAL
UROBILINOGEN, URINE: 0.2 E.U./DL

## 2022-07-14 PROCEDURE — 81001 URINALYSIS AUTO W/SCOPE: CPT

## 2022-07-14 PROCEDURE — 96375 TX/PRO/DX INJ NEW DRUG ADDON: CPT

## 2022-07-14 PROCEDURE — 84703 CHORIONIC GONADOTROPIN ASSAY: CPT

## 2022-07-14 PROCEDURE — 99285 EMERGENCY DEPT VISIT HI MDM: CPT

## 2022-07-14 PROCEDURE — 96365 THER/PROPH/DIAG IV INF INIT: CPT

## 2022-07-14 PROCEDURE — 96372 THER/PROPH/DIAG INJ SC/IM: CPT

## 2022-07-14 RX ORDER — SODIUM CHLORIDE, SODIUM LACTATE, POTASSIUM CHLORIDE, AND CALCIUM CHLORIDE .6; .31; .03; .02 G/100ML; G/100ML; G/100ML; G/100ML
1000 INJECTION, SOLUTION INTRAVENOUS ONCE
Status: COMPLETED | OUTPATIENT
Start: 2022-07-14 | End: 2022-07-15

## 2022-07-14 RX ORDER — ONDANSETRON 2 MG/ML
4 INJECTION INTRAMUSCULAR; INTRAVENOUS ONCE
Status: COMPLETED | OUTPATIENT
Start: 2022-07-14 | End: 2022-07-15

## 2022-07-14 ASSESSMENT — PAIN DESCRIPTION - LOCATION: LOCATION: ABDOMEN

## 2022-07-14 ASSESSMENT — PAIN DESCRIPTION - ORIENTATION: ORIENTATION: RIGHT

## 2022-07-14 ASSESSMENT — PAIN DESCRIPTION - DESCRIPTORS: DESCRIPTORS: CRAMPING;PRESSURE;DISCOMFORT

## 2022-07-14 ASSESSMENT — PAIN - FUNCTIONAL ASSESSMENT: PAIN_FUNCTIONAL_ASSESSMENT: 0-10

## 2022-07-14 ASSESSMENT — PAIN SCALES - GENERAL: PAINLEVEL_OUTOF10: 8

## 2022-07-15 ENCOUNTER — APPOINTMENT (OUTPATIENT)
Dept: CT IMAGING | Age: 27
End: 2022-07-15
Payer: COMMERCIAL

## 2022-07-15 VITALS
DIASTOLIC BLOOD PRESSURE: 68 MMHG | SYSTOLIC BLOOD PRESSURE: 110 MMHG | OXYGEN SATURATION: 99 % | RESPIRATION RATE: 17 BRPM | TEMPERATURE: 98.7 F | HEART RATE: 78 BPM

## 2022-07-15 LAB
A/G RATIO: 1.6 (ref 1.1–2.2)
ALBUMIN SERPL-MCNC: 4.5 G/DL (ref 3.4–5)
ALP BLD-CCNC: 61 U/L (ref 40–129)
ALT SERPL-CCNC: 11 U/L (ref 10–40)
ANION GAP SERPL CALCULATED.3IONS-SCNC: 11 MMOL/L (ref 3–16)
AST SERPL-CCNC: 17 U/L (ref 15–37)
BACTERIA: ABNORMAL /HPF
BASOPHILS ABSOLUTE: 0 K/UL (ref 0–0.2)
BASOPHILS RELATIVE PERCENT: 0.3 %
BILIRUB SERPL-MCNC: 0.3 MG/DL (ref 0–1)
BUN BLDV-MCNC: 10 MG/DL (ref 7–20)
CALCIUM SERPL-MCNC: 9.6 MG/DL (ref 8.3–10.6)
CHLORIDE BLD-SCNC: 105 MMOL/L (ref 99–110)
CO2: 24 MMOL/L (ref 21–32)
CREAT SERPL-MCNC: 0.9 MG/DL (ref 0.6–1.1)
EOSINOPHILS ABSOLUTE: 0.1 K/UL (ref 0–0.6)
EOSINOPHILS RELATIVE PERCENT: 1.9 %
EPITHELIAL CELLS, UA: ABNORMAL /HPF (ref 0–5)
GFR AFRICAN AMERICAN: >60
GFR NON-AFRICAN AMERICAN: >60
GLUCOSE BLD-MCNC: 125 MG/DL (ref 70–99)
HCT VFR BLD CALC: 43.3 % (ref 36–48)
HEMOGLOBIN: 14.5 G/DL (ref 12–16)
LIPASE: 36 U/L (ref 13–60)
LYMPHOCYTES ABSOLUTE: 2.1 K/UL (ref 1–5.1)
LYMPHOCYTES RELATIVE PERCENT: 32.5 %
MCH RBC QN AUTO: 31.4 PG (ref 26–34)
MCHC RBC AUTO-ENTMCNC: 33.3 G/DL (ref 31–36)
MCV RBC AUTO: 94.3 FL (ref 80–100)
MONOCYTES ABSOLUTE: 0.7 K/UL (ref 0–1.3)
MONOCYTES RELATIVE PERCENT: 10.4 %
MUCUS: ABNORMAL /LPF
NEUTROPHILS ABSOLUTE: 3.5 K/UL (ref 1.7–7.7)
NEUTROPHILS RELATIVE PERCENT: 54.9 %
PDW BLD-RTO: 13 % (ref 12.4–15.4)
PLATELET # BLD: 244 K/UL (ref 135–450)
PMV BLD AUTO: 8.8 FL (ref 5–10.5)
POTASSIUM REFLEX MAGNESIUM: 4.1 MMOL/L (ref 3.5–5.1)
RBC # BLD: 4.6 M/UL (ref 4–5.2)
RBC UA: ABNORMAL /HPF (ref 0–4)
RENAL EPITHELIAL, UA: ABNORMAL /HPF (ref 0–1)
SODIUM BLD-SCNC: 140 MMOL/L (ref 136–145)
TOTAL PROTEIN: 7.3 G/DL (ref 6.4–8.2)
WBC # BLD: 6.4 K/UL (ref 4–11)
WBC UA: ABNORMAL /HPF (ref 0–5)

## 2022-07-15 PROCEDURE — 2580000003 HC RX 258: Performed by: EMERGENCY MEDICINE

## 2022-07-15 PROCEDURE — 2580000003 HC RX 258: Performed by: PHYSICIAN ASSISTANT

## 2022-07-15 PROCEDURE — 96375 TX/PRO/DX INJ NEW DRUG ADDON: CPT

## 2022-07-15 PROCEDURE — 80053 COMPREHEN METABOLIC PANEL: CPT

## 2022-07-15 PROCEDURE — 87086 URINE CULTURE/COLONY COUNT: CPT

## 2022-07-15 PROCEDURE — 74177 CT ABD & PELVIS W/CONTRAST: CPT

## 2022-07-15 PROCEDURE — 96365 THER/PROPH/DIAG IV INF INIT: CPT

## 2022-07-15 PROCEDURE — 6360000002 HC RX W HCPCS: Performed by: PHYSICIAN ASSISTANT

## 2022-07-15 PROCEDURE — 85025 COMPLETE CBC W/AUTO DIFF WBC: CPT

## 2022-07-15 PROCEDURE — 6360000004 HC RX CONTRAST MEDICATION: Performed by: PHYSICIAN ASSISTANT

## 2022-07-15 PROCEDURE — 83690 ASSAY OF LIPASE: CPT

## 2022-07-15 PROCEDURE — 96372 THER/PROPH/DIAG INJ SC/IM: CPT

## 2022-07-15 PROCEDURE — 6360000002 HC RX W HCPCS: Performed by: EMERGENCY MEDICINE

## 2022-07-15 RX ORDER — METHOCARBAMOL 750 MG/1
750 TABLET, FILM COATED ORAL 3 TIMES DAILY PRN
Qty: 12 TABLET | Refills: 0 | Status: SHIPPED | OUTPATIENT
Start: 2022-07-15 | End: 2022-07-18 | Stop reason: ALTCHOICE

## 2022-07-15 RX ORDER — KETOROLAC TROMETHAMINE 30 MG/ML
15 INJECTION, SOLUTION INTRAMUSCULAR; INTRAVENOUS ONCE
Status: COMPLETED | OUTPATIENT
Start: 2022-07-15 | End: 2022-07-15

## 2022-07-15 RX ORDER — DICYCLOMINE HYDROCHLORIDE 10 MG/ML
20 INJECTION INTRAMUSCULAR ONCE
Status: COMPLETED | OUTPATIENT
Start: 2022-07-15 | End: 2022-07-15

## 2022-07-15 RX ADMIN — SODIUM CHLORIDE, POTASSIUM CHLORIDE, SODIUM LACTATE AND CALCIUM CHLORIDE 1000 ML: 600; 310; 30; 20 INJECTION, SOLUTION INTRAVENOUS at 00:14

## 2022-07-15 RX ADMIN — DICYCLOMINE HYDROCHLORIDE 20 MG: 20 INJECTION, SOLUTION INTRAMUSCULAR at 04:34

## 2022-07-15 RX ADMIN — ONDANSETRON 4 MG: 2 INJECTION INTRAMUSCULAR; INTRAVENOUS at 00:17

## 2022-07-15 RX ADMIN — IOPAMIDOL 80 ML: 755 INJECTION, SOLUTION INTRAVENOUS at 03:46

## 2022-07-15 RX ADMIN — KETOROLAC TROMETHAMINE 15 MG: 30 INJECTION, SOLUTION INTRAMUSCULAR at 04:33

## 2022-07-15 RX ADMIN — HYDROMORPHONE HYDROCHLORIDE 0.5 MG: 1 INJECTION, SOLUTION INTRAMUSCULAR; INTRAVENOUS; SUBCUTANEOUS at 00:17

## 2022-07-15 RX ADMIN — METHOCARBAMOL 1000 MG: 100 INJECTION INTRAMUSCULAR; INTRAVENOUS at 06:42

## 2022-07-15 ASSESSMENT — PAIN DESCRIPTION - LOCATION
LOCATION: ABDOMEN

## 2022-07-15 ASSESSMENT — PAIN DESCRIPTION - PAIN TYPE: TYPE: ACUTE PAIN;CHRONIC PAIN

## 2022-07-15 ASSESSMENT — ENCOUNTER SYMPTOMS
SHORTNESS OF BREATH: 0
ABDOMINAL PAIN: 1
DIARRHEA: 0
RESPIRATORY NEGATIVE: 1
NAUSEA: 1
CONSTIPATION: 0
VOMITING: 0

## 2022-07-15 ASSESSMENT — PAIN DESCRIPTION - DESCRIPTORS
DESCRIPTORS: CRAMPING

## 2022-07-15 ASSESSMENT — PAIN DESCRIPTION - ONSET: ONSET: GRADUAL

## 2022-07-15 ASSESSMENT — PAIN SCALES - GENERAL
PAINLEVEL_OUTOF10: 3
PAINLEVEL_OUTOF10: 8

## 2022-07-15 ASSESSMENT — PAIN DESCRIPTION - FREQUENCY: FREQUENCY: INTERMITTENT

## 2022-07-15 NOTE — ED PROVIDER NOTES
ED Attending Attestation Note     Date of evaluation: 7/14/2022    This patient was seen by the advance practice provider. I have seen and examined the patient, agree with the workup, evaluation, management and diagnosis. The care plan has been discussed. My assessment reveals under, generally well-appearing young woman, in some discomfort, but in no acute respiratory distress. This patient has an unfortunate history of recurrent abdominal pain of uncertain etiology. She is followed by Dr. Juan Pablo Hunt of gastroenterology, and has reportedly had endoscopies that are somewhat concerning for an inflammatory process, although diagnosis is uncertain. Her pain is inconsistent. She has a repeat endoscopy upcoming. She was reportedly told that if she has worsening of her pain again that she should come to the emergency department for CT, as cross-sectional imaging during a flare of pain would be most helpful. She had worsening of her pain again tonight, and therefore presents to the emergency department hoping to have this imaging pursued, and also seeking improvement of her discomfort. Her abdomen is overall soft and benign. Today she has pain that is more on the right side of the abdomen, both upper and lower quadrants, whereas previously his he had pain that was more prominent on the left side of the abdomen. ADDENDUM:      Care of this patient was assumed from physicians assistant primarily, at the conclusion of her shift at 3:00 in the morning. Care was given over to me primarily pending final results of CT, and response to therapy. Diagnostic Results       RADIOLOGY:  CT ABDOMEN PELVIS W IV CONTRAST Additional Contrast? None   Final Result          LABS:   Results for orders placed or performed during the hospital encounter of 07/14/22   Culture, Urine    Specimen: Urine, clean catch   Result Value Ref Range    Urine Culture, Routine       <10,000 CFU/ml mixed skin/urogenital christiane.  No further workup Urinalysis   Result Value Ref Range    Color, UA Yellow Straw/Yellow    Clarity, UA Clear Clear    Glucose, Ur 250 (A) Negative mg/dL    Bilirubin Urine Negative Negative    Ketones, Urine Negative Negative mg/dL    Specific Gravity, UA 1.025 1.005 - 1.030    Blood, Urine Negative Negative    pH, UA 6.0 5.0 - 8.0    Protein, UA Negative Negative mg/dL    Urobilinogen, Urine 0.2 <2.0 E.U./dL    Nitrite, Urine Negative Negative    Leukocyte Esterase, Urine MODERATE (A) Negative    Microscopic Examination YES     Urine Type Voided    Pregnancy, urine   Result Value Ref Range    HCG(Urine) Pregnancy Test Negative Detects HCG level >20 MIU/mL   Microscopic Urinalysis   Result Value Ref Range    Mucus, UA Rare (A) None Seen /LPF    WBC, UA 21-50 (A) 0 - 5 /HPF    RBC, UA 0-2 0 - 4 /HPF    Epithelial Cells, UA  (A) 0 - 5 /HPF    Renal Epithelial, UA 0-1 0 - 1 /HPF    Bacteria, UA 4+ (A) None Seen /HPF   CBC with Auto Differential   Result Value Ref Range    WBC 6.4 4.0 - 11.0 K/uL    RBC 4.60 4.00 - 5.20 M/uL    Hemoglobin 14.5 12.0 - 16.0 g/dL    Hematocrit 43.3 36.0 - 48.0 %    MCV 94.3 80.0 - 100.0 fL    MCH 31.4 26.0 - 34.0 pg    MCHC 33.3 31.0 - 36.0 g/dL    RDW 13.0 12.4 - 15.4 %    Platelets 408 419 - 204 K/uL    MPV 8.8 5.0 - 10.5 fL    Neutrophils % 54.9 %    Lymphocytes % 32.5 %    Monocytes % 10.4 %    Eosinophils % 1.9 %    Basophils % 0.3 %    Neutrophils Absolute 3.5 1.7 - 7.7 K/uL    Lymphocytes Absolute 2.1 1.0 - 5.1 K/uL    Monocytes Absolute 0.7 0.0 - 1.3 K/uL    Eosinophils Absolute 0.1 0.0 - 0.6 K/uL    Basophils Absolute 0.0 0.0 - 0.2 K/uL   Comprehensive Metabolic Panel w/ Reflex to MG   Result Value Ref Range    Sodium 140 136 - 145 mmol/L    Potassium reflex Magnesium 4.1 3.5 - 5.1 mmol/L    Chloride 105 99 - 110 mmol/L    CO2 24 21 - 32 mmol/L    Anion Gap 11 3 - 16    Glucose 125 (H) 70 - 99 mg/dL    BUN 10 7 - 20 mg/dL    CREATININE 0.9 0.6 - 1.1 mg/dL    GFR Non-African American >60 >60 GFR African American >60 >60    Calcium 9.6 8.3 - 10.6 mg/dL    Total Protein 7.3 6.4 - 8.2 g/dL    Albumin 4.5 3.4 - 5.0 g/dL    Albumin/Globulin Ratio 1.6 1.1 - 2.2    Total Bilirubin 0.3 0.0 - 1.0 mg/dL    Alkaline Phosphatase 61 40 - 129 U/L    ALT 11 10 - 40 U/L    AST 17 15 - 37 U/L   Lipase   Result Value Ref Range    Lipase 36.0 13.0 - 60.0 U/L       RECENT VITALS:  BP: 110/68, Temp: 98.7 °F (37.1 °C), Heart Rate: 78, Resp: 17, SpO2: 99 %     Procedures       ED Course          The patient was given the following medications:  Orders Placed This Encounter   Medications    HYDROmorphone (DILAUDID) injection 0.5 mg    ondansetron (ZOFRAN) injection 4 mg    lactated ringers bolus    iopamidol (ISOVUE-370) 76 % injection 80 mL    ketorolac (TORADOL) injection 15 mg    dicyclomine (BENTYL) injection 20 mg    methocarbamol (ROBAXIN) 1,000 mg in dextrose 5 % 100 mL IVPB    DISCONTD: methocarbamol (ROBAXIN-750) 750 MG tablet     Sig: Take 1 tablet by mouth 3 times daily as needed (cramping pain)     Dispense:  12 tablet     Refill:  0       CONSULTS:  None    MEDICAL DECISION MAKING / ASSESSMENT / Charlotte John is a 33 y.o. female with a history of chronic recurrent abdominal discomfort and GI symptoms of uncertain etiology as described above. She presented tonight for worsening of her abdominal pain, hoping to have a CT done during a flare of pain as recommended by her gastroenterologist, and also seeking management of her discomfort. She was initially treated with fluids, Zofran, and 0.5 mg of hydromorphone, although she indicates that this improved her discomfort minimally. At the time my reevaluation, the patient complained of worsening pain, and describes it as a very tight, spasming, cramping sensation. Previous visit, she had experienced significant relief with Toradol and Bentyl, so this was ordered for the patient.   On further reevaluation, however, she did not feel that this had significantly impacted her pain, which she continued to describe as a cramping, spasming pain in the abdomen. She stated that she felt as though her muscles were sore after to many crunches. Given this description of the pain, the patient was given a dose of IV Robaxin. On final reevaluation, the patient stated that she felt that this had significantly improved her pain. As result, she is given a short course of this medication should she have recurrence of her pain as an outpatient, and was encouraged to continue follow-up with her gastroenterologist.  She has an outpatient appointment scheduled later this afternoon. Clinical Impression     1.  Recurrent abdominal pain        Disposition     PATIENT REFERRED TO:  Precious Fallon MD  [address]  [place] [phone]    Go today  as scheduled    DISCHARGE MEDICATIONS:  Discharge Medication List as of 7/15/2022  7:47 AM        START taking these medications    Details   methocarbamol (ROBAXIN-750) 750 MG tablet Take 1 tablet by mouth 3 times daily as needed (cramping pain), Disp-12 tablet, R-0Print             DISPOSITION Decision To Discharge 07/15/2022 04:04:04 AM        Rosanne Haywood MD  07/19/22 [id number]

## 2022-07-15 NOTE — DISCHARGE INSTRUCTIONS
As discussed, your labs are reassuring, and your CT did not show any concerning cause for your recurrent abdominal pain. Please call your gastroenterologist in the morning, to discuss your ER visit and the results of testing, and to arrange a follow-up appointment. Take your home medications as prescribed. Call your doctor, or return to the emergency department, for worsening symptoms or other concerns.

## 2022-07-15 NOTE — ED PROVIDER NOTES
[address] [facility] ENCOUNTER          PHYSICIAN ASSISTANT NOTE       Date of evaluation: 7/14/2022    Chief Complaint     Abdominal Pain (RLQ began yesterday with nausea and bloating. States got very severe today after eating meal. )      History of Present Illness     Jacinto Yoon is a 33 y.o. female who presents with abdominal pain. Patient presents with right-sided abdominal pain. Patient reports she felt it somewhat yesterday but she went to bed and it got better. However, it returned this evening after eating and is not improving. Patient reports associated nausea and bloating. Patient has not vomited. Normal bowel movements recently. No fever or chills. No history of abdominal surgery. Patient reports she does have abdominal \"issues. \"  Patient reports she sees a GI doctor and has had an endoscopy and colonoscopy in the past.  Patient reports they were concerning for possible Crohn's disease, but it cannot be confirmed at that time. Patient reports she had an elevated CRP at the time which was concerning for Crohn's. Patient reports she takes Linzess, an antacid, and a medication for Crohn's that she cannot recall the name. Patient reports she was told if she developed pain again that she should get an abdominal CT. Review of Systems     Review of Systems   Constitutional: Negative. Negative for fever. Respiratory: Negative. Negative for shortness of breath. Cardiovascular: Negative. Negative for chest pain. Gastrointestinal: Positive for abdominal pain and nausea. Negative for constipation, diarrhea and vomiting. Musculoskeletal: Negative. Skin: Negative. Neurological: Negative. All other systems reviewed and are negative. Past Medical, Surgical, Family, and Social History     She has a past medical history of Abdominal pain, Chronic nonintractable headache, Constipation, and Diarrhea.   She has a past surgical history that includes [place] tooth extraction; Foot surgery (Right, 12/2017); Colonoscopy (N/A, 2/25/2021); and Upper gastrointestinal endoscopy (N/A, 2/25/2021). Her family history includes Depression in her paternal grandfather; Diabetes in her paternal grandmother; Heart Disease in her father, maternal grandfather, and maternal grandmother; Other in her maternal uncle, mother, and paternal aunt; Stroke in her father. She reports that she has never smoked. She has never used smokeless tobacco. She reports current alcohol use. She reports that she does not use drugs. Medications     Previous Medications    DICYCLOMINE (BENTYL) 10 MG CAPSULE    Take 1 capsule by mouth every 6 hours as needed (cramping abdominal pain)    HYOSCYAMINE (OSCIMIN) 125 MCG TBDP DISPERSIBLE TABLET    DISSOLVE 1 TABLET ON THE TONGUE 30 TO 60 MINUTES BEFORE MEALS    LINACLOTIDE (LINZESS) 290 MCG CAPS CAPSULE    Take by mouth every morning    NORGESTIMATE-ETH ESTRADIOL (SPRINTEC 28 PO)    Take 1 tablet by mouth daily    ONDANSETRON (ZOFRAN ODT) 4 MG DISINTEGRATING TABLET    Place 1 tablet under the tongue every 8 hours as needed for Nausea       Allergies     She is allergic to shiitake mushroom. Physical Exam     INITIAL VITALS: BP: 121/83,  , Heart Rate: 85, Resp: 18, SpO2: 98 %  Physical Exam  Vitals and nursing note reviewed. Constitutional:       General: She is not in acute distress. Appearance: She is not ill-appearing. HENT:      Head: Normocephalic and atraumatic. Cardiovascular:      Rate and Rhythm: Normal rate and regular rhythm. Pulmonary:      Effort: Pulmonary effort is normal.      Breath sounds: Normal breath sounds. Abdominal:      General: Abdomen is flat. Palpations: Abdomen is soft. Tenderness: There is abdominal tenderness in the right upper quadrant. There is no guarding or rebound. Skin:     General: Skin is warm and dry. Neurological:      General: No focal deficit present.       Mental Status: She is alert and oriented to person, place, and time. Psychiatric:         Mood and Affect: Mood normal.         Behavior: Behavior normal.         Diagnostic Results       RADIOLOGY:  No orders to display       LABS:   Results for orders placed or performed during the hospital encounter of 07/14/22   Urinalysis   Result Value Ref Range    Color, UA Yellow Straw/Yellow    Clarity, UA Clear Clear    Glucose, Ur 250 (A) Negative mg/dL    Bilirubin Urine Negative Negative    Ketones, Urine Negative Negative mg/dL    Specific Gravity, UA 1.025 1.005 - 1.030    Blood, Urine Negative Negative    pH, UA 6.0 5.0 - 8.0    Protein, UA Negative Negative mg/dL    Urobilinogen, Urine 0.2 <2.0 E.U./dL    Nitrite, Urine Negative Negative    Leukocyte Esterase, Urine MODERATE (A) Negative    Microscopic Examination YES     Urine Type Voided    Pregnancy, urine   Result Value Ref Range    HCG(Urine) Pregnancy Test Negative Detects HCG level >20 MIU/mL       ED BEDSIDE ULTRASOUND:  No results found. RECENT VITALS:  BP: 121/83,  , Heart Rate: 85, Resp: 18, SpO2: 98 %     Procedures         ED Course     Nursing Notes, Past Medical Hx,Past Surgical Hx, Social Hx, Allergies, and Family Hx were reviewed. The patient was given the following medications:  Orders Placed This Encounter   Medications    HYDROmorphone (DILAUDID) injection 0.5 mg    ondansetron (ZOFRAN) injection 4 mg    lactated ringers bolus       CONSULTS:  None    MEDICAL DECISION MAKING / ASSESSMENT / Reglahoward Martin is a 33 y.o. female with abdominal pain. Patient is alert and in no acute distress. Vitals are normal.  Patient with tenderness to the right upper quadrant without peritoneal signs. hCG is negative. Labs and CT pending. Dr. Brett Bae will follow-up on pending results and disposition the patient. This patient was also evaluated by the attending physician. All care plans were discussed and agreed upon.     Clinical Impression     Abdominal pain RUQ    Disposition     PATIENT REFERRED TO:  No follow-up provider specified.     DISCHARGE MEDICATIONS:  New Prescriptions    No medications on file       DISPOSITION  pending        Carlotta Smart PA-C  07/15/22 [id number]

## 2022-07-16 LAB — URINE CULTURE, ROUTINE: NORMAL

## 2022-07-20 ENCOUNTER — ANESTHESIA EVENT (OUTPATIENT)
Dept: ENDOSCOPY | Age: 27
End: 2022-07-20
Payer: COMMERCIAL

## 2022-07-20 ENCOUNTER — ANESTHESIA (OUTPATIENT)
Dept: ENDOSCOPY | Age: 27
End: 2022-07-20
Payer: COMMERCIAL

## 2022-07-20 ENCOUNTER — HOSPITAL ENCOUNTER (OUTPATIENT)
Age: 27
Setting detail: OUTPATIENT SURGERY
Discharge: HOME | End: 2022-07-20
Attending: INTERNAL MEDICINE | Admitting: INTERNAL MEDICINE
Payer: COMMERCIAL

## 2022-07-20 VITALS
DIASTOLIC BLOOD PRESSURE: 65 MMHG | OXYGEN SATURATION: 100 % | RESPIRATION RATE: 16 BRPM | SYSTOLIC BLOOD PRESSURE: 99 MMHG | BODY MASS INDEX: 20.32 KG/M2 | TEMPERATURE: 98.2 F | HEIGHT: 72 IN | HEART RATE: 67 BPM | WEIGHT: 150 LBS

## 2022-07-20 LAB — HCG(URINE) PREGNANCY TEST: NEGATIVE

## 2022-07-20 PROCEDURE — 2500000003 HC RX 250 WO HCPCS: Performed by: REGISTERED NURSE

## 2022-07-20 PROCEDURE — 3609027000 HC COLONOSCOPY: Performed by: INTERNAL MEDICINE

## 2022-07-20 PROCEDURE — 2580000003 HC RX 258: Performed by: ANESTHESIOLOGY

## 2022-07-20 PROCEDURE — 3700000000 HC ANESTHESIA ATTENDED CARE: Performed by: INTERNAL MEDICINE

## 2022-07-20 PROCEDURE — 84703 CHORIONIC GONADOTROPIN ASSAY: CPT

## 2022-07-20 PROCEDURE — 7100000010 HC PHASE II RECOVERY - FIRST 15 MIN: Performed by: INTERNAL MEDICINE

## 2022-07-20 PROCEDURE — 2580000003 HC RX 258: Performed by: REGISTERED NURSE

## 2022-07-20 PROCEDURE — 3700000001 HC ADD 15 MINUTES (ANESTHESIA): Performed by: INTERNAL MEDICINE

## 2022-07-20 PROCEDURE — 7100000011 HC PHASE II RECOVERY - ADDTL 15 MIN: Performed by: INTERNAL MEDICINE

## 2022-07-20 PROCEDURE — 6360000002 HC RX W HCPCS: Performed by: REGISTERED NURSE

## 2022-07-20 PROCEDURE — 2709999900 HC NON-CHARGEABLE SUPPLY: Performed by: INTERNAL MEDICINE

## 2022-07-20 RX ORDER — PROPOFOL 10 MG/ML
INJECTION, EMULSION INTRAVENOUS PRN
Status: DISCONTINUED | OUTPATIENT
Start: 2022-07-20 | End: 2022-07-20

## 2022-07-20 RX ORDER — PROPOFOL 10 MG/ML
INJECTION, EMULSION INTRAVENOUS CONTINUOUS PRN
Status: DISCONTINUED | OUTPATIENT
Start: 2022-07-20 | End: 2022-07-20 | Stop reason: SDUPTHER

## 2022-07-20 RX ORDER — LIDOCAINE HYDROCHLORIDE 20 MG/ML
INJECTION, SOLUTION EPIDURAL; INFILTRATION; INTRACAUDAL; PERINEURAL PRN
Status: DISCONTINUED | OUTPATIENT
Start: 2022-07-20 | End: 2022-07-20 | Stop reason: SDUPTHER

## 2022-07-20 RX ORDER — SODIUM CHLORIDE 9 MG/ML
INJECTION, SOLUTION INTRAVENOUS CONTINUOUS PRN
Status: DISCONTINUED | OUTPATIENT
Start: 2022-07-20 | End: 2022-07-20 | Stop reason: SDUPTHER

## 2022-07-20 RX ORDER — SODIUM CHLORIDE 9 MG/ML
INJECTION, SOLUTION INTRAVENOUS CONTINUOUS
Status: DISCONTINUED | OUTPATIENT
Start: 2022-07-20 | End: 2022-07-20 | Stop reason: HOSPADM

## 2022-07-20 RX ORDER — PROPOFOL 10 MG/ML
INJECTION, EMULSION INTRAVENOUS PRN
Status: DISCONTINUED | OUTPATIENT
Start: 2022-07-20 | End: 2022-07-20 | Stop reason: SDUPTHER

## 2022-07-20 RX ADMIN — SODIUM CHLORIDE: 9 INJECTION, SOLUTION INTRAVENOUS at 10:51

## 2022-07-20 RX ADMIN — PROPOFOL 50 MG: 10 INJECTION, EMULSION INTRAVENOUS at 11:25

## 2022-07-20 RX ADMIN — LIDOCAINE HYDROCHLORIDE 100 MG: 20 INJECTION, SOLUTION EPIDURAL; INFILTRATION; INTRACAUDAL; PERINEURAL at 11:19

## 2022-07-20 RX ADMIN — SODIUM CHLORIDE: 9 INJECTION, SOLUTION INTRAVENOUS at 11:18

## 2022-07-20 RX ADMIN — PROPOFOL 20 MG: 10 INJECTION, EMULSION INTRAVENOUS at 11:29

## 2022-07-20 RX ADMIN — PROPOFOL 50 MG: 10 INJECTION, EMULSION INTRAVENOUS at 11:19

## 2022-07-20 RX ADMIN — PROPOFOL 150 MCG/KG/MIN: 10 INJECTION, EMULSION INTRAVENOUS at 11:19

## 2022-07-20 ASSESSMENT — PAIN - FUNCTIONAL ASSESSMENT
PAIN_FUNCTIONAL_ASSESSMENT: 0-10
PAIN_FUNCTIONAL_ASSESSMENT: ACTIVITIES ARE NOT PREVENTED

## 2022-07-20 ASSESSMENT — PAIN DESCRIPTION - DESCRIPTORS: DESCRIPTORS: DISCOMFORT

## 2022-07-20 NOTE — PROGRESS NOTES
Patient _X__ reached   _____not reached-preop instructions left on voice mail_____________      DATE___7/18/22_____ TIME___1200_____ARRIVAL___1030  FEC______      Nothing to eat or drink after midnight the night before,except for what the prep instructions call for. If you do not have the instructions or do not understand them please contact your doctors office. Follow any instructions your doctors office has given you including what medications to take the AM of your procedure and which ones to hold. You may use your inhalers. If you take a long acting insulin the laura prior please cut the dose in half and take no diabetic medications that AM.Follow specific doctors office instructions regarding blood thinners and if they want you to hold and for how long. If you are on a blood thinner and have no instructions please contact the office and ask. Dress comfortably,bring your insurance card,picture ID,and a complete list of medications, including supplements. You must have a responsible adult to stay with you during the procedure,drive you home and stay with you. [facility] phone number [phone] for any questions. OTHER INSTRUCTIONS(if applicable)_________________________________________________________        VISITOR POLICY(subject to change)    Current visitor policy is 2 visitors per patient. No children allowed. Mask are required. Visiting hours are 8a-8p. Overnight visitors will be at the discretion of the nurse.
Post-procedure education reviewed with patient and visitor, and they verbalized understanding.
Pt ready for discharge - pt discharged in stable condition.
Reviewed pt problem list, history, H&P and assessment preoperatively. Scope verified using 2 person system. Family in waiting room.
Teaching / education initiated regarding perioperative experience, expectations, and pain management during stay. Patient verbalized understanding.
To endo recovery from procedure room. VSS, awakens to voice, respirations easy and unlabored.
Name band;

## 2022-07-20 NOTE — ANESTHESIA POSTPROCEDURE EVALUATION
Department of Anesthesiology  Postprocedure Note    Patient: Gerson Reddy  MRN: [id number]  YOB: 1995  Date of evaluation: 7/20/2022      Procedure Summary     Date: 07/20/22 Room / Location: [address]    Anesthesia Start: 1118 Anesthesia Stop: 1139    Procedure: COLONOSCOPY DIAGNOSTIC Diagnosis:       Abdominal pain, unspecified abdominal location      (Abdominal pain, unspecified abdominal location [R10.9])    Surgeons: Mateo Gee MD Responsible Provider: Cesar Walton MD    Anesthesia Type: MAC ASA Status: 2          Anesthesia Type: No value filed.     Rayne Phase I: Rayne Score: 10    Rayne Phase II:        Anesthesia Post Evaluation    Patient location during evaluation: PACU  Patient participation: complete - patient participated  Level of consciousness: awake  Airway patency: patent  Nausea & Vomiting: no vomiting and no nausea  Complications: no  Cardiovascular status: hemodynamically stable  Respiratory status: acceptable  Hydration status: stable  Multimodal analgesia pain management approach

## 2022-07-20 NOTE — DISCHARGE INSTRUCTIONS
COLONSCOPY DISCHARGE INSTRUCTIONS    You may experience some lightheadedness for the next several hours. Plan on quiet relaxation for the rest of today. Nap for four hours following procedure if possible. A responsible adult needs to stay with you today. Eat bland food and avoid anything greasy or spicy initially-progress to your normal diet gradually. Diet restrictions as instructed. You may resume home medications as instructed. It is not unusual to experience some mild cramping or gas pains, and you may not have a bowel movement for several days. If you had a polyp removed, avoid strenuous activity for 48 hours. Avoid the use of aspirin or related compounds for one week, unless otherwise instructed by your physician. You may notice a small amount of blood in your next few bowel movements, but if a large amount passes, call your physician. If you have any of the following problems, notify your physician or return to the hospital emergency room : fever, chills, excessive bleeding, excessive vomiting, difficulty swallowing, uncontrolled pain, increased abdominal distention, shortness of breath or any other problems. Call your doctor at [phone] if you have any concerns. If you had any biopsies or polyps call for results in 5-7 business days. See your physician's report for details about your procedure and recommendations. ANESTHESIA DISCHARGE INSTRUCTIONS    Wear your seatbelt home. You are under the influence of drugs-do not drink alcohol, drive ,operate machinery,or make any important decisions or sign any legal documentsfor 24 hours. You may resume normal activities tomorrow. A responsible adult needs to be with you for 24 hours. You may experience lightheadedness,dizziness,or sleepiness following surgery. Rest at home today- increase activity as tolerated. It is recommended to take a four hour nap after procedure.   Progress slowly to a regular diet unless your physician has instructed you otherwise. Avoid spicy and greasy food on first meal.  Drink plenty of water. If nausea becomes a problem call your physician. Call your doctor if concerns arise. Hemorrhoids: Care Instructions  Overview     Hemorrhoids are swollen veins that develop in the anal canal. Bleeding during bowel movements, itching, and rectal pain are the most common symptoms. Hemorrhoids can be uncomfortable at times, but rarely are they a seriousproblem. Most of the time, you can treat them with simple changes to your diet and bowel habits. These changes include eating more fiber and not straining to pass stools. Most hemorrhoids don't need surgery or other treatment unless they arevery large and painful or bleed a lot. Follow-up care is a key part of your treatment and safety. Be sure to make and go to all appointments, and call your doctor if you are having problems. It's also a good idea to know your test results and keep alist of the medicines you take. How can you care for yourself at home? Sit in a few inches of warm water (sitz bath) 3 times a day and after bowel movements. The warm water helps with pain and itching. Put ice on your anal area several times a day for 10 minutes at a time. Put a thin cloth between the ice and your skin. Follow this by placing a warm, wet towel on the area for another 10 to 20 minutes. Take pain medicines exactly as directed. If the doctor gave you a prescription medicine for pain, take it as prescribed. If you are not taking a prescription pain medicine, ask your doctor if you can take an over-the-counter medicine. Keep the anal area clean, but be gentle. Use water and a fragrance-free soap, or use baby wipes or medicated pads such as Tucks. Wear cotton underwear and loose clothing to decrease moisture in the anal area. Eat more fiber. Include foods such as whole-grain breads and cereals, raw vegetables, raw and dried fruits, and beans.   Drink plenty of fluids. If you have kidney, heart, or liver disease and have to limit fluids, talk with your doctor before you increase the amount of fluids you drink. Use a stool softener that contains bran or psyllium. You can save money by buying bran or psyllium (available in bulk at most health food stores) and sprinkling it on foods or stirring it into fruit juice. Or you can use a product such as Metamucil or Hydrocil. Practice healthy bowel habits. Go to the bathroom as soon as you have the urge. Avoid straining to pass stools. Relax and give yourself time to let things happen naturally. Do not hold your breath while passing stools. Do not read while sitting on the toilet. Get off the toilet as soon as you have finished. Take your medicines exactly as prescribed. Call your doctor if you think you are having a problem with your medicine. When should you call for help? Call 911 anytime you think you may need emergency care. For example, call if:    You pass maroon or very bloody stools. Call your doctor now or seek immediate medical care if:    You have increased pain. You have increased bleeding. Watch closely for changes in your health, and be sure to contact your doctor if:    Your symptoms have not improved after 3 or 4 days. Where can you learn more? Go to https://[employer]. org and sign in to your [employer] account. Enter Q899 in the [facility] box to learn more about \"Hemorrhoids: Care Instructions. \"     If you do not have an account, please click on the \"Sign Up Now\" link. Current as of: September 8, 2021               Content Version: 13.3  © 8952-3371 [employer], [employer]. Care instructions adapted under license by [facility] ([facility]). If you have questions about a medical condition or this instruction, always ask your healthcare professional. [address] any warranty or liability for your use of this information.

## 2022-07-20 NOTE — H&P
Gastroenterology Note                 Pre-operative History and Physical    Patient: Brenda Escamilla  : 1995  CSN:     History Obtained From:   Patient or guardian. HISTORY OF PRESENT ILLNESS:    The patient is a 33 y.o. female here for Endoscopy. Past Medical History:    Past Medical History:   Diagnosis Date    Abdominal pain     Chronic nonintractable headache 2016    Constipation      Past Surgical History:    Past Surgical History:   Procedure Laterality Date    COLONOSCOPY N/A 2021    COLONOSCOPY WITH BIOPSY performed by Bess Lott MD at [facility] Right 2017    UPPER GASTROINTESTINAL ENDOSCOPY N/A 2021    EGD BIOPSY performed by Bess Lott MD at [address] [facility]      26  years  old     Medications Prior to Admission:   No current facility-administered medications on file prior to encounter.      Current Outpatient Medications on File Prior to Encounter   Medication Sig Dispense Refill    ondansetron (ZOFRAN ODT) 4 MG disintegrating tablet Place 1 tablet under the tongue every 8 hours as needed for Nausea (Patient not taking: Reported on 2022) 20 tablet 0    dicyclomine (BENTYL) 10 MG capsule Take 1 capsule by mouth every 6 hours as needed (cramping abdominal pain) 20 capsule 0    linaclotide (LINZESS) 290 MCG CAPS capsule Take by mouth every morning          Allergies:  Shiitake mushroom      Social History:   Social History     Tobacco Use    Smoking status: Never    Smokeless tobacco: Never   Substance Use Topics    Alcohol use: Not Currently     Family History:   Family History   Problem Relation Age of Onset    Heart Disease Maternal Grandmother     Heart Disease Maternal Grandfather     Diabetes Paternal Grandmother     Depression Paternal Grandfather     Other Mother         fibromyalgia    Stroke Father     Heart Disease Father         Altamease [place]ers in his heart\"    Other Maternal Uncle ulcerative colitis    Other Paternal Aunt         ilcerative colitis       PHYSICAL EXAM:      /70   Pulse 63   Temp 97.1 °F (36.2 °C) (Temporal)   Resp 14   Ht 6' (1.829 m)   Wt 150 lb (68 kg)   LMP 07/03/2022 (Exact Date)   SpO2 99%   BMI 20.34 kg/m²  I        Heart:  RRR, normal s1s2    Lungs:  CTA and normal effort    Abdomen:   Soft, nt nd. ASSESSMENT AND PLAN:    1. Patient is a 33 y.o. female here for endoscopy with MAC sedation. 2.  Procedure options, risks and benefits reviewed with patient and/or guardian. They express understanding.

## 2022-07-20 NOTE — ANESTHESIA PRE PROCEDURE
Department of Anesthesiology  Preprocedure Note       Name:  Gerson Reddy   Age:  33 y.o.  :  1995                                          MRN:  [id number]         Date:  2022      Surgeon: Lord Majano):  Mateo Gee MD    Procedure: Procedure(s):  COLONOSCOPY DIAGNOSTIC    Medications prior to admission:   Prior to Admission medications    Medication Sig Start Date End Date Taking? Authorizing Provider   ondansetron (ZOFRAN ODT) 4 MG disintegrating tablet Place 1 tablet under the tongue every 8 hours as needed for Nausea 3/15/22   Marnie Gibbons MD   dicyclomine (BENTYL) 10 MG capsule Take 1 capsule by mouth every 6 hours as needed (cramping abdominal pain) 3/15/22   Marnie Gibbons MD   linaclotide Aquiles Eaton) 290 MCG CAPS capsule Take by mouth every morning 21   Historical Provider, MD       Current medications:    Current Outpatient Medications   Medication Sig Dispense Refill    ondansetron (ZOFRAN ODT) 4 MG disintegrating tablet Place 1 tablet under the tongue every 8 hours as needed for Nausea 20 tablet 0    dicyclomine (BENTYL) 10 MG capsule Take 1 capsule by mouth every 6 hours as needed (cramping abdominal pain) 20 capsule 0    linaclotide (LINZESS) 290 MCG CAPS capsule Take by mouth every morning       No current facility-administered medications for this visit. Allergies: Allergies   Allergen Reactions    Shiitake Mushroom Itching     All mushrooms. ithcy throat.  Mild swelling of throat       Problem List:    Patient Active Problem List   Diagnosis Code    Fatigue R53.83    LUQ abdominal pain R10.12    Encounter for contraceptive management Z30.9       Past Medical History:        Diagnosis Date    Abdominal pain     Chronic nonintractable headache 2016    Constipation     Diarrhea        Past Surgical History:        Procedure Laterality Date    COLONOSCOPY N/A 2021    COLONOSCOPY WITH BIOPSY performed by Mahnaz Rodrigues MD at [address] [place] [address] SURGERY Right 12/2017    UPPER GASTROINTESTINAL ENDOSCOPY N/A 2/25/2021    EGD BIOPSY performed by Luke Markham MD at [address] EXTRACTION      26  years  old       Social History:    Social History     Tobacco Use    Smoking status: Never    Smokeless tobacco: Never   Substance Use Topics    Alcohol use: Yes                                Counseling given: Not Answered      Vital Signs (Current): There were no vitals filed for this visit. BP Readings from Last 3 Encounters:   07/15/22 110/68   03/15/22 97/63   02/25/21 (!) 93/52       NPO Status:                                                                                 BMI:   Wt Readings from Last 3 Encounters:   07/18/22 150 lb (68 kg)   03/14/22 155 lb (70.3 kg)   02/25/21 148 lb (67.1 kg)     There is no height or weight on file to calculate BMI.    CBC:   Lab Results   Component Value Date/Time    WBC 6.4 07/15/2022 12:21 AM    RBC 4.60 07/15/2022 12:21 AM    HGB 14.5 07/15/2022 12:21 AM    HCT 43.3 07/15/2022 12:21 AM    MCV 94.3 07/15/2022 12:21 AM    RDW 13.0 07/15/2022 12:21 AM     07/15/2022 12:21 AM       CMP:   Lab Results   Component Value Date/Time     07/15/2022 12:21 AM    K 4.1 07/15/2022 12:21 AM     07/15/2022 12:21 AM    CO2 24 07/15/2022 12:21 AM    BUN 10 07/15/2022 12:21 AM    CREATININE 0.9 07/15/2022 12:21 AM    GFRAA >60 07/15/2022 12:21 AM    AGRATIO 1.6 07/15/2022 12:21 AM    LABGLOM >60 07/15/2022 12:21 AM    GLUCOSE 125 07/15/2022 12:21 AM    PROT 7.3 07/15/2022 12:21 AM    CALCIUM 9.6 07/15/2022 12:21 AM    BILITOT 0.3 07/15/2022 12:21 AM    ALKPHOS 61 07/15/2022 12:21 AM    AST 17 07/15/2022 12:21 AM    ALT 11 07/15/2022 12:21 AM       POC Tests: No results for input(s): POCGLU, POCNA, POCK, POCCL, POCBUN, POCHEMO, POCHCT in the last 72 hours.     Coags: No results found for: PROTIME, INR, APTT    HCG (If Applicable):   Lab Results

## (undated) DEVICE — MOUTHPIECE ENDOSCP L CTRL OPN AND SIDE PORTS DISP

## (undated) DEVICE — VALVE SUCTION AIR H2O SET ORCA POD + DISP

## (undated) DEVICE — AIR/WATER CLEANING ADAPTER FOR OLYMPUS® GI ENDOSCOPE: Brand: BULLDOG®

## (undated) DEVICE — PROCEDURE KIT ENDOSCP CUST

## (undated) DEVICE — SET VLV 3 PC AWS DISPOSABLE GRDIAN SCOPEVALET

## (undated) DEVICE — SOLUTION IV IRRIG WATER 500ML POUR BRL ST 2F7113

## (undated) DEVICE — BW-412T DISP COMBO CLEANING BRUSH: Brand: SINGLE USE COMBINATION CLEANING BRUSH

## (undated) DEVICE — FORCEPS BX L240CM WRK CHN 2.8MM STD CAP W/ NDL MIC MESH

## (undated) DEVICE — GOWN AURORA NONREINF LG: Brand: MEDLINE INDUSTRIES, INC.

## (undated) DEVICE — ENDOSCOPIC KIT 6X3/16 FT COLON W/ 1.1 OZ 2 GWN W/O BRSH